# Patient Record
Sex: FEMALE | Race: WHITE | NOT HISPANIC OR LATINO | Employment: OTHER | URBAN - METROPOLITAN AREA
[De-identification: names, ages, dates, MRNs, and addresses within clinical notes are randomized per-mention and may not be internally consistent; named-entity substitution may affect disease eponyms.]

---

## 2024-06-08 PROBLEM — Z79.1 NSAID LONG-TERM USE: Status: ACTIVE | Noted: 2024-06-08

## 2024-06-08 PROBLEM — M15.9 GENERALIZED OSTEOARTHRITIS: Status: ACTIVE | Noted: 2024-06-08

## 2024-06-08 PROBLEM — M35.00 SJOGREN'S SYNDROME WITHOUT EXTRAGLANDULAR INVOLVEMENT: Status: ACTIVE | Noted: 2024-06-08

## 2024-06-08 PROBLEM — M79.7 FIBROMYALGIA: Status: ACTIVE | Noted: 2024-06-08

## 2024-06-08 PROBLEM — Z79.899 LONG-TERM USE OF PLAQUENIL: Status: ACTIVE | Noted: 2024-06-08

## 2024-06-09 PROBLEM — M19.90 OSTEOARTHRITIS: Status: ACTIVE | Noted: 2024-06-09

## 2024-06-09 PROBLEM — R76.8 POSITIVE ANA (ANTINUCLEAR ANTIBODY): Status: ACTIVE | Noted: 2024-06-09

## 2024-06-10 NOTE — ASSESSMENT & PLAN NOTE
* 2/7/2018: Direct WYATT Positive, CMP Normal, CBC normal, TSH high at 8.05  * 5/18/2018: SSB +, IgG RF weakly +   * Medications/treatments/interventions tried include: Ibuprofen, Mobic, she saw Dr. Rocha (Rheumatologist), Tylenol, Plaquenil, Celebrex, Cymbalta, Norco, she has seen a chiropractor, knee injection, back injections    1. Continue Plaquenil BID.  2. She needs regular dental and ophthalmic examinations.   3. Continue Celebrex PRN  4. See below regarding myofascial pain.   5. Follow up in 4-6 months.   6. Labs ordered.   7. Refill medications  8. She uses gel drops for dry eyes.  9. She has had improvement in night mouth dryness with use of CPAP.  10. She reports fatigue as the worst issue.  She does not sleep well at night.  She can trial melatonin but encouraged her to talk with PCP about this.

## 2024-06-10 NOTE — ASSESSMENT & PLAN NOTE
* Medications/treatments/interventions tried include: Ibuprofen, Mobic, she saw Dr. Rocha (Rheumatologist), Tylenol, Plaquenil, Celebrex, Cymbalta, Norco, she has seen a chiropractor, knee injection, back injections     1. Tylenol PRN is ok as directed.   2. She takes Celebrex PRN   3. Weight loss would be helpful.  4. She has taken hydrocodone/APAP PRN  5. She has seen a chiropractor  6. She has had back and knee injections.  We can inject her knees as needed.  7. She has gone to the chiropractor   8. She has seen a pain management specialist  9. She has seen an orthopaedic surgeon   10. She has had more soreness in her hands when she first wakes up.  Sometimes tingling as well.

## 2024-06-10 NOTE — ASSESSMENT & PLAN NOTE
* Plaquenil 200 mg PO twice/day for Sjogren's syndrome     On this medication the patient needs to have an eye exam at least once/year to monitor for toxicity. No eye complaints today. No recent serious infections

## 2024-06-10 NOTE — ASSESSMENT & PLAN NOTE
* Medications/treatments/interventions tried include: Ibuprofen, Mobic, she saw Dr. Rocha (Rheumatologist), Tylenol, Plaquenil, Celebrex, Cymbalta, Norco, she has seen a chiropractor, knee injection, back injections    1. H & P consistent with this diagnosis. Today she rates her pain as 3/10 in severity.   2. Encourage aerobic activity and sleep hygiene.  3. 12/2018 she was diagnosed with sleep apnea. Treating sleep apnea can improve myofascial pain.   4. She will follow up with us in 4-6 months.  5. Weight loss would be helpful.  6. Continue Celebrex PRN   7. Tylenol PRN as directed is ok  8. Continue/refill Cymbalta   9. Encouraged aerobic activity/exercise   10. She has seen pain management and taken back injections PRN

## 2024-06-10 NOTE — ASSESSMENT & PLAN NOTE
* Celebrex 200 mg PO PRN twice/day for pain relief    Do not take over-the counter anti-inflammatory medicines, such as ibuprofen or naproxen (Advil, Motrin, Aleve and others), as they may cause problems when combined with your prescription medications.  In general, low dose daily aspirin for the treatment or prevention of heart disease or stroke is safe to take.  Acetaminophen (Tylenol) is generally safe to take as directed for headaches, cramps or other aches and pains or fever reduction

## 2024-06-11 ENCOUNTER — OFFICE VISIT (OUTPATIENT)
Age: 64
End: 2024-06-11
Payer: MEDICARE

## 2024-06-11 ENCOUNTER — LAB (OUTPATIENT)
Facility: HOSPITAL | Age: 64
End: 2024-06-11
Payer: MEDICARE

## 2024-06-11 VITALS
BODY MASS INDEX: 48.82 KG/M2 | WEIGHT: 293 LBS | DIASTOLIC BLOOD PRESSURE: 86 MMHG | HEIGHT: 65 IN | TEMPERATURE: 96.7 F | HEART RATE: 64 BPM | SYSTOLIC BLOOD PRESSURE: 132 MMHG

## 2024-06-11 DIAGNOSIS — Z79.1 NSAID LONG-TERM USE: ICD-10-CM

## 2024-06-11 DIAGNOSIS — M79.7 FIBROMYALGIA: ICD-10-CM

## 2024-06-11 DIAGNOSIS — M35.00 SJOGREN'S SYNDROME WITHOUT EXTRAGLANDULAR INVOLVEMENT: Primary | ICD-10-CM

## 2024-06-11 DIAGNOSIS — M15.9 GENERALIZED OSTEOARTHRITIS: ICD-10-CM

## 2024-06-11 DIAGNOSIS — M35.00 SJOGREN'S SYNDROME WITHOUT EXTRAGLANDULAR INVOLVEMENT: ICD-10-CM

## 2024-06-11 DIAGNOSIS — Z79.899 LONG-TERM USE OF PLAQUENIL: ICD-10-CM

## 2024-06-11 LAB
ALBUMIN SERPL-MCNC: 3.9 G/DL (ref 3.5–5.2)
ALBUMIN/GLOB SERPL: 1.1 G/DL
ALP SERPL-CCNC: 71 U/L (ref 39–117)
ALT SERPL W P-5'-P-CCNC: 23 U/L (ref 1–33)
ANION GAP SERPL CALCULATED.3IONS-SCNC: 10.4 MMOL/L (ref 5–15)
AST SERPL-CCNC: 21 U/L (ref 1–32)
BILIRUB SERPL-MCNC: 0.4 MG/DL (ref 0–1.2)
BILIRUB UR QL STRIP: NEGATIVE
BUN SERPL-MCNC: 11 MG/DL (ref 8–23)
BUN/CREAT SERPL: 12.9 (ref 7–25)
CALCIUM SPEC-SCNC: 9.6 MG/DL (ref 8.6–10.5)
CHLORIDE SERPL-SCNC: 104 MMOL/L (ref 98–107)
CLARITY UR: ABNORMAL
CO2 SERPL-SCNC: 25.6 MMOL/L (ref 22–29)
COLOR UR: YELLOW
CREAT SERPL-MCNC: 0.85 MG/DL (ref 0.57–1)
CRP SERPL-MCNC: 0.51 MG/DL (ref 0–0.5)
DEPRECATED RDW RBC AUTO: 41.9 FL (ref 37–54)
EGFRCR SERPLBLD CKD-EPI 2021: 76.6 ML/MIN/1.73
ERYTHROCYTE [DISTWIDTH] IN BLOOD BY AUTOMATED COUNT: 13.2 % (ref 12.3–15.4)
GLOBULIN UR ELPH-MCNC: 3.6 GM/DL
GLUCOSE SERPL-MCNC: 106 MG/DL (ref 65–99)
GLUCOSE UR STRIP-MCNC: NEGATIVE MG/DL
HCT VFR BLD AUTO: 42.4 % (ref 34–46.6)
HGB BLD-MCNC: 14.2 G/DL (ref 12–15.9)
HGB UR QL STRIP.AUTO: NEGATIVE
KETONES UR QL STRIP: NEGATIVE
LEUKOCYTE ESTERASE UR QL STRIP.AUTO: ABNORMAL
MCH RBC QN AUTO: 29.2 PG (ref 26.6–33)
MCHC RBC AUTO-ENTMCNC: 33.5 G/DL (ref 31.5–35.7)
MCV RBC AUTO: 87.2 FL (ref 79–97)
NITRITE UR QL STRIP: POSITIVE
PH UR STRIP.AUTO: 6 [PH] (ref 5–8)
PLATELET # BLD AUTO: 220 10*3/MM3 (ref 140–450)
PMV BLD AUTO: 9.8 FL (ref 6–12)
POTASSIUM SERPL-SCNC: 4.4 MMOL/L (ref 3.5–5.2)
PROT SERPL-MCNC: 7.5 G/DL (ref 6–8.5)
PROT UR QL STRIP: NEGATIVE
RBC # BLD AUTO: 4.86 10*6/MM3 (ref 3.77–5.28)
SODIUM SERPL-SCNC: 140 MMOL/L (ref 136–145)
SP GR UR STRIP: 1.01 (ref 1–1.03)
UROBILINOGEN UR QL STRIP: ABNORMAL
WBC NRBC COR # BLD AUTO: 5.56 10*3/MM3 (ref 3.4–10.8)

## 2024-06-11 PROCEDURE — G2211 COMPLEX E/M VISIT ADD ON: HCPCS | Performed by: NURSE PRACTITIONER

## 2024-06-11 PROCEDURE — 80053 COMPREHEN METABOLIC PANEL: CPT

## 2024-06-11 PROCEDURE — 84165 PROTEIN E-PHORESIS SERUM: CPT

## 2024-06-11 PROCEDURE — 36415 COLL VENOUS BLD VENIPUNCTURE: CPT

## 2024-06-11 PROCEDURE — 86140 C-REACTIVE PROTEIN: CPT

## 2024-06-11 PROCEDURE — 81003 URINALYSIS AUTO W/O SCOPE: CPT

## 2024-06-11 PROCEDURE — 85027 COMPLETE CBC AUTOMATED: CPT

## 2024-06-11 PROCEDURE — 85007 BL SMEAR W/DIFF WBC COUNT: CPT

## 2024-06-11 PROCEDURE — 99214 OFFICE O/P EST MOD 30 MIN: CPT | Performed by: NURSE PRACTITIONER

## 2024-06-11 PROCEDURE — 85652 RBC SED RATE AUTOMATED: CPT

## 2024-06-11 RX ORDER — HYDROXYCHLOROQUINE SULFATE 200 MG/1
200 TABLET, FILM COATED ORAL 2 TIMES DAILY
Qty: 180 TABLET | Refills: 1 | Status: SHIPPED | OUTPATIENT
Start: 2024-06-11

## 2024-06-11 RX ORDER — DULOXETIN HYDROCHLORIDE 60 MG/1
60 CAPSULE, DELAYED RELEASE ORAL 2 TIMES DAILY
Qty: 180 CAPSULE | Refills: 1 | Status: SHIPPED | OUTPATIENT
Start: 2024-06-11

## 2024-06-11 RX ORDER — HYDROCHLOROTHIAZIDE 12.5 MG/1
CAPSULE, GELATIN COATED ORAL
COMMUNITY
Start: 2024-01-30

## 2024-06-11 RX ORDER — CELECOXIB 200 MG/1
200 CAPSULE ORAL DAILY PRN
Qty: 90 CAPSULE | Refills: 1 | Status: SHIPPED | OUTPATIENT
Start: 2024-06-11

## 2024-06-11 NOTE — PROGRESS NOTES
Office Visit       Date: 06/11/2024   Patient Name: Vicky Hodges  MRN: [id number]  YOB: 1960    Referring Physician: Izzy Good APRN     Chief Complaint: Sjogren's    History of Present Illness: Vicky Hodges is a 64 y.o. female who is here today for follow-up of Sjogren's syndrome.  Today she reports feeling the same.  She rates her pain 4 out of 10 and has 1 hour of morning stiffness.  She needs refill on her medications today.  She has been started on HCTZ since we last saw her.  No recent illnesses or infections.      Subjective   Review of Systems:  Review of Systems   Constitutional:  Positive for fatigue and unexpected weight change.   HENT:  Positive for sore throat and tinnitus.         Dry eyes, dry mouth, nasal drainage, nasal sores   Eyes:  Positive for visual disturbance.   Respiratory:  Positive for shortness of breath.    Cardiovascular:  Positive for chest pain and leg swelling.        Raynaud's, varicose veins   Gastrointestinal:  Positive for abdominal distention and constipation.        Heartburn   Endocrine: Positive for cold intolerance, heat intolerance, polydipsia and polyuria.        Hair loss   Genitourinary:         Nocturia   Musculoskeletal:  Positive for arthralgias, back pain, joint swelling and myalgias.   Neurological:  Positive for weakness, numbness and headaches.        Tingling   Psychiatric/Behavioral:  Positive for sleep disturbance.    All other systems reviewed and are negative.       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       Family History:   Family History   Problem Relation Age of Onset    Other  Mother         primary biliary    Cirrhosis Mother     Diabetes Mother     Cirrhosis Father         alcohol related    Uterine cancer Brother     Diabetes Brother     Sleep apnea Brother     Hypertension Brother        Social History:   Social History     Socioeconomic History    Marital status: Single   Tobacco Use    Smoking status: Never    Smokeless tobacco: Never   Vaping Use    Vaping status: Never Used   Substance and Sexual Activity    Alcohol use: Never    Drug use: Never       Medications:   Current Outpatient Medications:     albuterol sulfate  (90 Base) MCG/ACT inhaler, Inhale 2 puffs Every 4 (Four) to 6 (Six) Hours As Needed for Wheezing., Disp: , Rfl:     budesonide-formoterol (SYMBICORT) 160-4.5 MCG/ACT inhaler, Inhale 2 puffs 2 (Two) Times a Day., Disp: , Rfl:     celecoxib (CeleBREX) 200 MG capsule, Take 1 capsule by mouth Daily As Needed for Mild Pain or Moderate Pain., Disp: 90 capsule, Rfl: 1    DULoxetine (CYMBALTA) 60 MG capsule, Take 1 capsule by mouth 2 (Two) Times a Day., Disp: 180 capsule, Rfl: 1    hydroCHLOROthiazide (MICROZIDE) 12.5 MG capsule, Take 1 capsule every day by oral route for 90 days., Disp: , Rfl:     hydroxychloroquine (PLAQUENIL) 200 MG tablet, Take 1 tablet by mouth 2 (Two) Times a Day., Disp: 180 tablet, Rfl: 1    levothyroxine sodium (Tirosint) 75 MCG capsule, Take 1 capsule by mouth Daily., Disp: , Rfl:     metoprolol tartrate (LOPRESSOR) 100 MG tablet, Take 1 tablet by mouth 2 (Two) Times a Day. With meals, Disp: , Rfl:     HYDROcodone-acetaminophen (NORCO) 5-325 MG per tablet, Take 1 tablet by mouth 2 (Two) Times a Day As Needed. (Patient not taking: Reported on 6/11/2024), Disp: , Rfl:     Allergies:   Allergies   Allergen Reactions    Amoxicillin Trihydrate [Amoxicillin] Rash    Potassium Clavulanate [Clavulanic Acid] Rash       I have reviewed and updated the patient's chief complaint, history of present illness, review of systems, past medical history,  "surgical history, family history, social history, medications and allergy list as appropriate.     Objective    Vital Signs:   Vitals:    06/11/24 0938   BP: 132/86   BP Location: Left arm   Patient Position: Sitting   Cuff Size: Large Adult   Pulse: 64   Temp: 96.7 °F (35.9 °C)   Weight: (!) 174 kg (382 lb 8 oz)   Height: 165.1 cm (65\")   PainSc:   4   PainLoc: Back     Class 3 Severe Obesity (BMI >=40). Obesity-related health conditions include the following: hypertension and osteoarthritis. Obesity is unchanged. BMI is is above average; no BMI management plan is appropriate. We discussed increasing exercise.        Physical Exam:  Physical Exam  Vitals reviewed.   Constitutional:       Appearance: Normal appearance. She is obese.   HENT:      Head: Normocephalic and atraumatic.      Mouth/Throat:      Mouth: Mucous membranes are dry.   Eyes:      Conjunctiva/sclera: Conjunctivae normal.   Cardiovascular:      Rate and Rhythm: Normal rate and regular rhythm.      Pulses: Normal pulses.      Heart sounds: Normal heart sounds.   Pulmonary:      Effort: Pulmonary effort is normal.      Breath sounds: Normal breath sounds.   Musculoskeletal:         General: Normal range of motion.      Cervical back: Normal range of motion and neck supple.      Comments: Crepitus bilateral knees.  18/18 positive myofascial tender points.   Skin:     General: Skin is warm and dry.   Neurological:      General: No focal deficit present.      Mental Status: She is alert and oriented to person, place, and time. Mental status is at baseline.   Psychiatric:         Mood and Affect: Mood normal.         Behavior: Behavior normal.         Thought Content: Thought content normal.         Judgment: Judgment normal.          Results Review:   Imaging Results (Last 24 Hours)       ** No results found for the last 24 hours. **            Procedures    Assessment / Plan    Assessment/Plan:   Diagnoses and all orders for this visit:    1. Sjogren's " syndrome without extraglandular involvement (Primary)  Assessment & Plan:  * 2/7/2018: Direct WYATT Positive, CMP Normal, CBC normal, TSH high at 8.05  * 5/18/2018: SSB +, IgG RF weakly +   * Medications/treatments/interventions tried include: Ibuprofen, Mobic, she saw Dr. Rocha (Rheumatologist), Tylenol, Plaquenil, Celebrex, Cymbalta, Norco, she has seen a chiropractor, knee injection, back injections    1. Continue Plaquenil BID.  2. She needs regular dental and ophthalmic examinations.   3. Continue Celebrex PRN  4. See below regarding myofascial pain.   5. Follow up in 4-6 months.   6. Labs ordered.   7. Refill medications  8. She uses gel drops for dry eyes.  9. She has had improvement in night mouth dryness with use of CPAP.  10. She reports fatigue as the worst issue.  She does not sleep well at night.  She can trial melatonin but encouraged her to talk with PCP about this.    Orders:  -     Protein Elec + Interp, Serum; Future  -     Urinalysis without microscopic (no culture) - Urine, Clean Catch; Future  -     CBC With Manual Differential; Future  -     Comprehensive Metabolic Panel; Future  -     C-reactive Protein; Future  -     Sedimentation Rate; Future  -     hydroxychloroquine (PLAQUENIL) 200 MG tablet; Take 1 tablet by mouth 2 (Two) Times a Day.  Dispense: 180 tablet; Refill: 1    2. Long-term use of Plaquenil  Assessment & Plan:  * Plaquenil 200 mg PO twice/day for Sjogren's syndrome     On this medication the patient needs to have an eye exam at least once/year to monitor for toxicity. No eye complaints today. No recent serious infections    Orders:  -     Urinalysis without microscopic (no culture) - Urine, Clean Catch; Future  -     CBC With Manual Differential; Future  -     Comprehensive Metabolic Panel; Future  -     C-reactive Protein; Future  -     Sedimentation Rate; Future    3. Fibromyalgia  Assessment & Plan:  * Medications/treatments/interventions tried include: IbuprofenBryanic, she saw  Dr. Rocha (Rheumatologist), Tylenol, Plaquenil, Celebrex, Cymbalta, Norco, she has seen a chiropractor, knee injection, back injections    1. H & P consistent with this diagnosis. Today she rates her pain as 3/10 in severity.   2. Encourage aerobic activity and sleep hygiene.  3. 12/2018 she was diagnosed with sleep apnea. Treating sleep apnea can improve myofascial pain.   4. She will follow up with us in 4-6 months.  5. Weight loss would be helpful.  6. Continue Celebrex PRN   7. Tylenol PRN as directed is ok  8. Continue/refill Cymbalta   9. Encouraged aerobic activity/exercise   10. She has seen pain management and taken back injections PRN     Orders:  -     DULoxetine (CYMBALTA) 60 MG capsule; Take 1 capsule by mouth 2 (Two) Times a Day.  Dispense: 180 capsule; Refill: 1    4. Generalized osteoarthritis  Assessment & Plan:  * Medications/treatments/interventions tried include: Ibuprofen, Mobic, she saw Dr. Rocha (Rheumatologist), Tylenol, Plaquenil, Celebrex, Cymbalta, Norco, she has seen a chiropractor, knee injection, back injections     1. Tylenol PRN is ok as directed.   2. She takes Celebrex PRN   3. Weight loss would be helpful.  4. She has taken hydrocodone/APAP PRN  5. She has seen a chiropractor  6. She has had back and knee injections.  We can inject her knees as needed.  7. She has gone to the chiropractor   8. She has seen a pain management specialist  9. She has seen an orthopaedic surgeon   10. She has had more soreness in her hands when she first wakes up.  Sometimes tingling as well.    Orders:  -     celecoxib (CeleBREX) 200 MG capsule; Take 1 capsule by mouth Daily As Needed for Mild Pain or Moderate Pain.  Dispense: 90 capsule; Refill: 1    5. NSAID long-term use  Assessment & Plan:  * Celebrex 200 mg PO PRN twice/day for pain relief    Do not take over-the counter anti-inflammatory medicines, such as ibuprofen or naproxen (Advil, Motrin, Aleve and others), as they may cause problems when  combined with your prescription medications.  In general, low dose daily aspirin for the treatment or prevention of heart disease or stroke is safe to take.  Acetaminophen (Tylenol) is generally safe to take as directed for headaches, cramps or other aches and pains or fever reduction          Follow Up:   Return 4-6 months, for Dr. Gomez.        THIAGO Rodriguez   Rheumatology [facility]

## 2024-06-12 LAB
ANISOCYTOSIS BLD QL: ABNORMAL
BASOPHILS # BLD MANUAL: 0 10*3/MM3 (ref 0–0.2)
BASOPHILS NFR BLD MANUAL: 0 % (ref 0–1.5)
EOSINOPHIL # BLD MANUAL: 0 10*3/MM3 (ref 0–0.4)
EOSINOPHIL NFR BLD MANUAL: 0 % (ref 0.3–6.2)
ERYTHROCYTE [SEDIMENTATION RATE] IN BLOOD: 27 MM/HR (ref 0–30)
LYMPHOCYTES # BLD MANUAL: 1.64 10*3/MM3 (ref 0.7–3.1)
LYMPHOCYTES NFR BLD MANUAL: 8.4 % (ref 5–12)
MONOCYTES # BLD: 0.47 10*3/MM3 (ref 0.1–0.9)
NEUTROPHILS # BLD AUTO: 3.45 10*3/MM3 (ref 1.7–7)
NEUTROPHILS NFR BLD MANUAL: 62.1 % (ref 42.7–76)
PLAT MORPH BLD: NORMAL
POLYCHROMASIA BLD QL SMEAR: ABNORMAL
VARIANT LYMPHS NFR BLD MANUAL: 29.5 % (ref 19.6–45.3)
WBC MORPH BLD: NORMAL

## 2024-06-13 LAB
ALBUMIN SERPL ELPH-MCNC: 3.4 G/DL (ref 2.9–4.4)
ALBUMIN/GLOB SERPL: 0.9 {RATIO} (ref 0.7–1.7)
ALPHA1 GLOB SERPL ELPH-MCNC: 0.3 G/DL (ref 0–0.4)
ALPHA2 GLOB SERPL ELPH-MCNC: 0.7 G/DL (ref 0.4–1)
B-GLOBULIN SERPL ELPH-MCNC: 1.2 G/DL (ref 0.7–1.3)
GAMMA GLOB SERPL ELPH-MCNC: 1.5 G/DL (ref 0.4–1.8)
GLOBULIN SER CALC-MCNC: 3.6 G/DL (ref 2.2–3.9)
LABORATORY COMMENT REPORT: NORMAL
M PROTEIN SERPL ELPH-MCNC: NORMAL G/DL
PROT PATTERN SERPL ELPH-IMP: NORMAL
PROT SERPL-MCNC: 7 G/DL (ref 6–8.5)

## 2024-06-14 ENCOUNTER — TELEPHONE (OUTPATIENT)
Age: 64
End: 2024-06-14
Payer: MEDICARE

## 2024-06-14 NOTE — TELEPHONE ENCOUNTER
----- Message from Davie Hernandez sent at 6/12/2024  8:03 AM EDT -----  Labs indicate UTI.  Please ask her if she has symptoms of UTI such as urinary frequency, urgency, hesitancy.  Pelvic pain, dysuria, foul-smelling urine?  If so let me know and I will send her in an antibiotic.    Called and left vm for patient to return call

## 2025-05-01 NOTE — ASSESSMENT & PLAN NOTE
* Medications/treatments/interventions tried include: Ibuprofen, Mobic, she saw Dr. Rocha (Rheumatologist), Tylenol, Plaquenil, Celebrex, Cymbalta, Norco, she has seen a chiropractor, knee injection, back injections     1. Tylenol PRN is ok as directed.   2. She takes Celebrex PRN   3. Weight loss would be helpful.  4. She has taken hydrocodone/APAP PRN  5. She has seen a chiropractor  6. She has had back and knee injections.  7. She has gone to the chiropractor   8. She has seen a pain management specialist  9. She has seen an orthopaedic surgeon   10. She has had more left knee pain.  She is using a cane.  Injection in left pain has helped but not the right.  11. Chiropractor is helpful for her knee pain.

## 2025-05-01 NOTE — ASSESSMENT & PLAN NOTE
* 2/7/2018: Direct WYATT Positive, CMP Normal, CBC normal, TSH high at 8.05  * 5/18/2018: SSB +, IgG RF weakly +   * Medications/treatments/interventions tried include: Ibuprofen, Mobic, she saw Dr. Rocha (Rheumatologist), Tylenol, Plaquenil, Celebrex, Cymbalta, Norco, she has seen a chiropractor, knee injection, back injections    1. Continue Plaquenil BID.  2. She needs regular dental and ophthalmic examinations.   3. Continue Celebrex PRN  4. See below regarding myofascial pain.   5. Follow up in 4-6 months.   6. Labs ordered.   7. Refill medications  8. She uses gel drops for dry eyes.  9. She has had improvement in night mouth dryness with use of CPAP.  10. She reports fatigue as the worst issue.  She does not sleep well at night.    11. Encourage exercise

## 2025-05-06 ENCOUNTER — LAB (OUTPATIENT)
Facility: HOSPITAL | Age: 65
End: 2025-05-06
Payer: MEDICARE

## 2025-05-06 ENCOUNTER — OFFICE VISIT (OUTPATIENT)
Age: 65
End: 2025-05-06
Payer: MEDICARE

## 2025-05-06 VITALS
WEIGHT: 293 LBS | HEIGHT: 65 IN | SYSTOLIC BLOOD PRESSURE: 140 MMHG | BODY MASS INDEX: 48.82 KG/M2 | HEART RATE: 61 BPM | DIASTOLIC BLOOD PRESSURE: 76 MMHG | TEMPERATURE: 97.6 F

## 2025-05-06 DIAGNOSIS — M35.00 SJOGREN'S SYNDROME WITHOUT EXTRAGLANDULAR INVOLVEMENT: Primary | ICD-10-CM

## 2025-05-06 DIAGNOSIS — Z79.1 NSAID LONG-TERM USE: ICD-10-CM

## 2025-05-06 DIAGNOSIS — M79.7 FIBROMYALGIA: ICD-10-CM

## 2025-05-06 DIAGNOSIS — M15.9 GENERALIZED OSTEOARTHRITIS: ICD-10-CM

## 2025-05-06 DIAGNOSIS — M89.9 DISORDER OF BONE, UNSPECIFIED: ICD-10-CM

## 2025-05-06 DIAGNOSIS — Z79.899 LONG-TERM USE OF PLAQUENIL: ICD-10-CM

## 2025-05-06 DIAGNOSIS — R53.83 OTHER FATIGUE: ICD-10-CM

## 2025-05-06 DIAGNOSIS — M35.00 SJOGREN'S SYNDROME WITHOUT EXTRAGLANDULAR INVOLVEMENT: ICD-10-CM

## 2025-05-06 DIAGNOSIS — Z78.0 POSTMENOPAUSE: ICD-10-CM

## 2025-05-06 PROCEDURE — 86140 C-REACTIVE PROTEIN: CPT

## 2025-05-06 PROCEDURE — 85025 COMPLETE CBC W/AUTO DIFF WBC: CPT

## 2025-05-06 PROCEDURE — 82306 VITAMIN D 25 HYDROXY: CPT

## 2025-05-06 PROCEDURE — 82607 VITAMIN B-12: CPT

## 2025-05-06 PROCEDURE — 36415 COLL VENOUS BLD VENIPUNCTURE: CPT

## 2025-05-06 PROCEDURE — 82746 ASSAY OF FOLIC ACID SERUM: CPT

## 2025-05-06 PROCEDURE — 84165 PROTEIN E-PHORESIS SERUM: CPT

## 2025-05-06 PROCEDURE — 80053 COMPREHEN METABOLIC PANEL: CPT

## 2025-05-06 PROCEDURE — 85652 RBC SED RATE AUTOMATED: CPT

## 2025-05-06 RX ORDER — CELECOXIB 200 MG/1
200 CAPSULE ORAL DAILY PRN
Qty: 90 CAPSULE | Refills: 1 | Status: SHIPPED | OUTPATIENT
Start: 2025-05-06

## 2025-05-06 RX ORDER — DULOXETIN HYDROCHLORIDE 60 MG/1
60 CAPSULE, DELAYED RELEASE ORAL 2 TIMES DAILY
Qty: 180 CAPSULE | Refills: 1 | Status: SHIPPED | OUTPATIENT
Start: 2025-05-06

## 2025-05-06 RX ORDER — HYDROXYCHLOROQUINE SULFATE 200 MG/1
200 TABLET, FILM COATED ORAL 2 TIMES DAILY
Qty: 180 TABLET | Refills: 1 | Status: SHIPPED | OUTPATIENT
Start: 2025-05-06

## 2025-05-06 NOTE — PROGRESS NOTES
Office Visit       Date: 05/06/2025   Patient Name: Vicky Hodges  MRN: [id number]  YOB: 1960    Referring Physician: No ref. provider found     Chief Complaint:   Chief Complaint   Patient presents with    Osteoarthritis    Sjogren Syndrome       History of Present Illness: Vicky Hodges is a 65 y.o. female who is here today for follow-up of Sjogren's syndrome.  Today she reports feeling the same.  She rates her pain 5 out of 10, global 4 out of 10 and 1 hour morning stiffness.  She request refills of Plaquenil, Celebrex and Cymbalta today.      Subjective   Review of Systems:  Review of Systems   Constitutional:  Positive for fatigue.   HENT:  Positive for tinnitus.         Dry mouth   Eyes:  Positive for visual disturbance.        Dry eyes   Respiratory:  Positive for shortness of breath and wheezing.    Cardiovascular:  Positive for leg swelling.   Gastrointestinal:  Positive for constipation.   Endocrine: Positive for cold intolerance, heat intolerance, polydipsia and polyuria.   Genitourinary:  Positive for frequency and urgency.        Urinary incontinence   Musculoskeletal:  Positive for arthralgias, back pain, gait problem, joint swelling and myalgias.   Skin:  Positive for color change.        Dry skin   Allergic/Immunologic: Positive for environmental allergies.   Neurological:  Positive for weakness, light-headedness, numbness and headaches.   Hematological:  Bruises/bleeds easily.   Psychiatric/Behavioral:  Positive for sleep disturbance.         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       Family History:   Family History   Problem  Relation Age of Onset    Other Mother         primary biliary    Cirrhosis Mother     Diabetes Mother     Cirrhosis Father         alcohol related    Uterine cancer Brother     Diabetes Brother     Sleep apnea Brother     Hypertension Brother        Social History:   Social History     Socioeconomic History    Marital status: Single   Tobacco Use    Smoking status: Never     Passive exposure: Past    Smokeless tobacco: Never   Vaping Use    Vaping status: Never Used   Substance and Sexual Activity    Alcohol use: Never    Drug use: Never    Sexual activity: Defer       Medications:   Current Outpatient Medications:     albuterol sulfate  (90 Base) MCG/ACT inhaler, Inhale 2 puffs Every 4 (Four) to 6 (Six) Hours As Needed for Wheezing., Disp: , Rfl:     budesonide-formoterol (SYMBICORT) 160-4.5 MCG/ACT inhaler, Inhale 2 puffs 2 (Two) Times a Day., Disp: , Rfl:     celecoxib (CeleBREX) 200 MG capsule, Take 1 capsule by mouth Daily As Needed for Mild Pain or Moderate Pain., Disp: 90 capsule, Rfl: 1    DULoxetine (CYMBALTA) 60 MG capsule, Take 1 capsule by mouth 2 (Two) Times a Day., Disp: 180 capsule, Rfl: 1    hydroCHLOROthiazide (MICROZIDE) 12.5 MG capsule, Take 1 capsule every day by oral route for 90 days., Disp: , Rfl:     hydroxychloroquine (PLAQUENIL) 200 MG tablet, Take 1 tablet by mouth 2 (Two) Times a Day., Disp: 180 tablet, Rfl: 1    levothyroxine sodium (Tirosint) 75 MCG capsule, Take 1 capsule by mouth Daily., Disp: , Rfl:     metoprolol tartrate (LOPRESSOR) 100 MG tablet, Take 1 tablet by mouth 2 (Two) Times a Day. With meals, Disp: , Rfl:     Allergies:   Allergies   Allergen Reactions    Amoxicillin Trihydrate [Amoxicillin] Rash    Potassium Clavulanate [Clavulanic Acid] Rash       I have reviewed and updated the patient's chief complaint, history of present illness, review of systems, past medical history, surgical history, family history, social history, medications and allergy list as  "appropriate.     Objective    Vital Signs:   Vitals:    05/06/25 0946   BP: 140/76   BP Location: Left arm   Patient Position: Sitting   Cuff Size: Adult   Pulse: 61   Temp: 97.6 °F (36.4 °C)   Weight: (!) 161 kg (354 lb)   Height: 165.1 cm (65\")   PainSc: 5    PainLoc: Generalized  Comment: left knee     Body mass index is 58.91 kg/m².           Physical Exam:  Physical Exam  Vitals reviewed.   Constitutional:       Appearance: Normal appearance.   HENT:      Head: Normocephalic and atraumatic.      Mouth/Throat:      Mouth: Mucous membranes are moist.   Eyes:      Conjunctiva/sclera: Conjunctivae normal.   Cardiovascular:      Rate and Rhythm: Normal rate and regular rhythm.      Pulses: Normal pulses.      Heart sounds: Normal heart sounds.   Pulmonary:      Effort: Pulmonary effort is normal.      Breath sounds: Normal breath sounds.   Musculoskeletal:         General: Normal range of motion.      Cervical back: Normal range of motion and neck supple.      Comments: No synovitis  Crepitus bilateral knees.  Left knee soft tissue swelling, mild.  OA changes bilateral DIP's     Skin:     General: Skin is warm and dry.   Neurological:      General: No focal deficit present.      Mental Status: She is alert and oriented to person, place, and time. Mental status is at baseline.   Psychiatric:         Mood and Affect: Mood normal.         Behavior: Behavior normal.         Thought Content: Thought content normal.         Judgment: Judgment normal.          Results Review:   Imaging Results (Last 24 Hours)       ** No results found for the last 24 hours. **            Procedures    Assessment / Plan    Assessment/Plan:   Diagnoses and all orders for this visit:    1. Sjogren's syndrome without extraglandular involvement (Primary)  Assessment & Plan:  * 2/7/2018: Direct WYATT Positive, CMP Normal, CBC normal, TSH high at 8.05  * 5/18/2018: SSB +, IgG RF weakly +   * Medications/treatments/interventions tried include: " Ibuprofen, Mobic, she saw Dr. Rocha (Rheumatologist), Tylenol, Plaquenil, Celebrex, Cymbalta, Norco, she has seen a chiropractor, knee injection, back injections    1. Continue Plaquenil BID.  2. She needs regular dental and ophthalmic examinations.   3. Continue Celebrex PRN  4. See below regarding myofascial pain.   5. Follow up in 4-6 months.   6. Labs ordered.   7. Refill medications  8. She uses gel drops for dry eyes.  9. She has had improvement in night mouth dryness with use of CPAP.  10. She reports fatigue as the worst issue.  She does not sleep well at night.    11. Encourage exercise    Orders:  -     Protein Elec + Interp, Serum; Future  -     Urinalysis With Culture If Indicated - Urine, Clean Catch; Future  -     Comprehensive Metabolic Panel; Future  -     C-reactive Protein; Future  -     Sedimentation Rate; Future  -     CBC & Differential; Future  -     hydroxychloroquine (PLAQUENIL) 200 MG tablet; Take 1 tablet by mouth 2 (Two) Times a Day.  Dispense: 180 tablet; Refill: 1    2. Fibromyalgia  Assessment & Plan:  * Medications/treatments/interventions tried include: Ibuprofen, Mobic, she saw Dr. Rocha (Rheumatologist), Tylenol, Plaquenil, Celebrex, Cymbalta, Norco, she has seen a chiropractor, knee injection, back injections    1. H & P consistent with this diagnosis. Today she rates her pain as 3/10 in severity.   2. Encourage aerobic activity and sleep hygiene.  3. 12/2018 she was diagnosed with sleep apnea. Treating sleep apnea can improve myofascial pain.   4. She will follow up with us in 4-6 months.  5. Weight loss would be helpful.  6. Continue Celebrex PRN   7. Tylenol PRN as directed is ok  8. Continue/refill Cymbalta   9. Encouraged aerobic activity/exercise   10. She has seen pain management and taken back injections PRN     Orders:  -     DULoxetine (CYMBALTA) 60 MG capsule; Take 1 capsule by mouth 2 (Two) Times a Day.  Dispense: 180 capsule; Refill: 1    3. Long-term use of  Plaquenil  Assessment & Plan:  * Plaquenil 200 mg PO twice/day for Sjogren's syndrome     On this medication the patient needs to have an eye exam at least once/year to monitor for toxicity. No eye complaints today. No recent serious infections    Orders:  -     Comprehensive Metabolic Panel; Future  -     C-reactive Protein; Future  -     Sedimentation Rate; Future  -     CBC & Differential; Future    4. NSAID long-term use  Assessment & Plan:  * Celebrex 200 mg PO PRN twice/day for pain relief    Do not take over-the counter anti-inflammatory medicines, such as ibuprofen or naproxen (Advil, Motrin, Aleve and others), as they may cause problems when combined with your prescription medications.  In general, low dose daily aspirin for the treatment or prevention of heart disease or stroke is safe to take.  Acetaminophen (Tylenol) is generally safe to take as directed for headaches, cramps or other aches and pains or fever reduction      5. Generalized osteoarthritis  Assessment & Plan:  * Medications/treatments/interventions tried include: Ibuprofen, Mobic, she saw Dr. Rocha (Rheumatologist), Tylenol, Plaquenil, Celebrex, Cymbalta, Norco, she has seen a chiropractor, knee injection, back injections     1. Tylenol PRN is ok as directed.   2. She takes Celebrex PRN   3. Weight loss would be helpful.  4. She has taken hydrocodone/APAP PRN  5. She has seen a chiropractor  6. She has had back and knee injections.  7. She has gone to the chiropractor   8. She has seen a pain management specialist  9. She has seen an orthopaedic surgeon   10. She has had more left knee pain.  She is using a cane.  Injection in left pain has helped but not the right.  11. Chiropractor is helpful for her knee pain.    Orders:  -     celecoxib (CeleBREX) 200 MG capsule; Take 1 capsule by mouth Daily As Needed for Mild Pain or Moderate Pain.  Dispense: 90 capsule; Refill: 1    6. Postmenopause  Assessment & Plan:  Dexa ordered.  She has not had  not had a DEXA before.    Orders:  -     DEXA Bone Density Axial; Future    7. Other fatigue  Assessment & Plan:  Fatigue continues to be her worst issue.  She has history of Vitamin D deficiency.  Check Vitamin D and Vitamin B12 with folate  She reports compliance with her CPAP    Orders:  -     Vitamin D,25-Hydroxy; Future  -     Vitamin B12 & Folate; Future    8. Disorder of bone, unspecified  -     Vitamin D,25-Hydroxy; Future        Follow Up:   Return in about 6 months (around 11/6/2025) for Dr. Gomez.        THIAGO Rodriguez  [facility]

## 2025-05-06 NOTE — ASSESSMENT & PLAN NOTE
Fatigue continues to be her worst issue.  She has history of Vitamin D deficiency.  Check Vitamin D and Vitamin B12 with folate  She reports compliance with her CPAP

## 2025-05-07 LAB
25(OH)D3 SERPL-MCNC: 26.9 NG/ML (ref 30–100)
ALBUMIN SERPL-MCNC: 4.2 G/DL (ref 3.5–5.2)
ALBUMIN/GLOB SERPL: 1.2 G/DL
ALP SERPL-CCNC: 96 U/L (ref 39–117)
ALT SERPL W P-5'-P-CCNC: 19 U/L (ref 1–33)
ANION GAP SERPL CALCULATED.3IONS-SCNC: 12 MMOL/L (ref 5–15)
AST SERPL-CCNC: 23 U/L (ref 1–32)
BASOPHILS # BLD AUTO: 0.08 10*3/MM3 (ref 0–0.2)
BASOPHILS NFR BLD AUTO: 1.1 % (ref 0–1.5)
BILIRUB SERPL-MCNC: 0.4 MG/DL (ref 0–1.2)
BUN SERPL-MCNC: 18 MG/DL (ref 8–23)
BUN/CREAT SERPL: 18 (ref 7–25)
CALCIUM SPEC-SCNC: 10 MG/DL (ref 8.6–10.5)
CHLORIDE SERPL-SCNC: 104 MMOL/L (ref 98–107)
CO2 SERPL-SCNC: 24 MMOL/L (ref 22–29)
CREAT SERPL-MCNC: 1 MG/DL (ref 0.57–1)
CRP SERPL-MCNC: 0.6 MG/DL (ref 0–0.5)
DEPRECATED RDW RBC AUTO: 40.2 FL (ref 37–54)
EGFRCR SERPLBLD CKD-EPI 2021: 62.6 ML/MIN/1.73
EOSINOPHIL # BLD AUTO: 0.1 10*3/MM3 (ref 0–0.4)
EOSINOPHIL NFR BLD AUTO: 1.4 % (ref 0.3–6.2)
ERYTHROCYTE [DISTWIDTH] IN BLOOD BY AUTOMATED COUNT: 12.6 % (ref 12.3–15.4)
ERYTHROCYTE [SEDIMENTATION RATE] IN BLOOD: 47 MM/HR (ref 0–30)
FOLATE SERPL-MCNC: 9.8 NG/ML (ref 4.78–24.2)
GLOBULIN UR ELPH-MCNC: 3.6 GM/DL
GLUCOSE SERPL-MCNC: 96 MG/DL (ref 65–99)
HCT VFR BLD AUTO: 43 % (ref 34–46.6)
HGB BLD-MCNC: 14 G/DL (ref 12–15.9)
IMM GRANULOCYTES # BLD AUTO: 0.01 10*3/MM3 (ref 0–0.05)
IMM GRANULOCYTES NFR BLD AUTO: 0.1 % (ref 0–0.5)
LYMPHOCYTES # BLD AUTO: 2.73 10*3/MM3 (ref 0.7–3.1)
LYMPHOCYTES NFR BLD AUTO: 38.1 % (ref 19.6–45.3)
MCH RBC QN AUTO: 28.7 PG (ref 26.6–33)
MCHC RBC AUTO-ENTMCNC: 32.6 G/DL (ref 31.5–35.7)
MCV RBC AUTO: 88.1 FL (ref 79–97)
MONOCYTES # BLD AUTO: 0.67 10*3/MM3 (ref 0.1–0.9)
MONOCYTES NFR BLD AUTO: 9.3 % (ref 5–12)
NEUTROPHILS NFR BLD AUTO: 3.58 10*3/MM3 (ref 1.7–7)
NEUTROPHILS NFR BLD AUTO: 50 % (ref 42.7–76)
NRBC BLD AUTO-RTO: 0 /100 WBC (ref 0–0.2)
PLATELET # BLD AUTO: 291 10*3/MM3 (ref 140–450)
PMV BLD AUTO: 9.9 FL (ref 6–12)
POTASSIUM SERPL-SCNC: 4.6 MMOL/L (ref 3.5–5.2)
PROT SERPL-MCNC: 7.8 G/DL (ref 6–8.5)
RBC # BLD AUTO: 4.88 10*6/MM3 (ref 3.77–5.28)
SODIUM SERPL-SCNC: 140 MMOL/L (ref 136–145)
VIT B12 BLD-MCNC: 586 PG/ML (ref 211–946)
WBC NRBC COR # BLD AUTO: 7.17 10*3/MM3 (ref 3.4–10.8)

## 2025-05-07 RX ORDER — ERGOCALCIFEROL 1.25 MG/1
50000 CAPSULE, LIQUID FILLED ORAL WEEKLY
Qty: 12 CAPSULE | Refills: 0 | Status: SHIPPED | OUTPATIENT
Start: 2025-05-07

## 2025-05-08 LAB
ALBUMIN SERPL ELPH-MCNC: 3.6 G/DL (ref 2.9–4.4)
ALBUMIN/GLOB SERPL: 0.9 {RATIO} (ref 0.7–1.7)
ALPHA1 GLOB SERPL ELPH-MCNC: 0.3 G/DL (ref 0–0.4)
ALPHA2 GLOB SERPL ELPH-MCNC: 0.8 G/DL (ref 0.4–1)
B-GLOBULIN SERPL ELPH-MCNC: 1.3 G/DL (ref 0.7–1.3)
GAMMA GLOB SERPL ELPH-MCNC: 1.5 G/DL (ref 0.4–1.8)
GLOBULIN SER CALC-MCNC: 4 G/DL (ref 2.2–3.9)
LABORATORY COMMENT REPORT: ABNORMAL
M PROTEIN SERPL ELPH-MCNC: ABNORMAL G/DL
PROT PATTERN SERPL ELPH-IMP: ABNORMAL
PROT SERPL-MCNC: 7.6 G/DL (ref 6–8.5)